# Patient Record
Sex: MALE | Race: WHITE | NOT HISPANIC OR LATINO | Employment: OTHER | URBAN - METROPOLITAN AREA
[De-identification: names, ages, dates, MRNs, and addresses within clinical notes are randomized per-mention and may not be internally consistent; named-entity substitution may affect disease eponyms.]

---

## 2019-08-07 ENCOUNTER — APPOINTMENT (OUTPATIENT)
Dept: RADIOLOGY | Facility: IMAGING CENTER | Age: 59
End: 2019-08-07
Attending: PHYSICIAN ASSISTANT
Payer: COMMERCIAL

## 2019-08-07 ENCOUNTER — OFFICE VISIT (OUTPATIENT)
Dept: URGENT CARE | Facility: CLINIC | Age: 59
End: 2019-08-07
Payer: COMMERCIAL

## 2019-08-07 VITALS
SYSTOLIC BLOOD PRESSURE: 142 MMHG | OXYGEN SATURATION: 98 % | TEMPERATURE: 98.1 F | WEIGHT: 242.6 LBS | BODY MASS INDEX: 31.13 KG/M2 | HEIGHT: 74 IN | HEART RATE: 68 BPM | DIASTOLIC BLOOD PRESSURE: 90 MMHG

## 2019-08-07 DIAGNOSIS — S32.010A CLOSED COMPRESSION FRACTURE OF FIRST LUMBAR VERTEBRA, INITIAL ENCOUNTER: ICD-10-CM

## 2019-08-07 DIAGNOSIS — M54.41 ACUTE BILATERAL LOW BACK PAIN WITH RIGHT-SIDED SCIATICA: ICD-10-CM

## 2019-08-07 PROCEDURE — 72100 X-RAY EXAM L-S SPINE 2/3 VWS: CPT | Mod: TC,FY | Performed by: PHYSICIAN ASSISTANT

## 2019-08-07 PROCEDURE — 99203 OFFICE O/P NEW LOW 30 MIN: CPT | Performed by: PHYSICIAN ASSISTANT

## 2019-08-07 RX ORDER — HYDROCODONE BITARTRATE AND ACETAMINOPHEN 5; 325 MG/1; MG/1
1-2 TABLET ORAL EVERY 4 HOURS PRN
Qty: 20 TAB | Refills: 0 | Status: SHIPPED | OUTPATIENT
Start: 2019-08-07 | End: 2019-08-14

## 2019-08-07 ASSESSMENT — PAIN SCALES - GENERAL: PAINLEVEL: 10=SEVERE PAIN

## 2019-08-07 ASSESSMENT — ENCOUNTER SYMPTOMS
BLURRED VISION: 0
BOWEL INCONTINENCE: 0
WEAKNESS: 0
PALPITATIONS: 0
SENSORY CHANGE: 1
PARESTHESIAS: 1
SHORTNESS OF BREATH: 0
PERIANAL NUMBNESS: 0
HEADACHES: 0
FEVER: 0
BACK PAIN: 1
TINGLING: 1
NAUSEA: 0
CHILLS: 0
VOMITING: 0
LEG PAIN: 1
ABDOMINAL PAIN: 0
WEIGHT LOSS: 0

## 2019-08-07 NOTE — PROGRESS NOTES
Subjective:      Eugene Will is a 59 y.o. male who presents with Back Pain (x 1day. slipped on stairs, landed on back, pain in right leg )       Back Pain    This is a new problem. The current episode started yesterday. The problem occurs constantly. The problem is unchanged. The pain is present in the lumbar spine. The quality of the pain is described as aching, shooting and stabbing. The pain radiates to the right thigh and right knee. The pain is severe. The pain is the same all the time. The symptoms are aggravated by bending, sitting, twisting and lying down. Associated symptoms include leg pain, paresthesias and tingling. Pertinent negatives include no abdominal pain, bladder incontinence, bowel incontinence, chest pain, dysuria, fever, headaches, pelvic pain, perianal numbness, weakness or weight loss. Risk factors include recent trauma (Patient fell yesterday and landed on the stairs to his camper). He has tried NSAIDs for the symptoms. The treatment provided no relief.       Review of Systems   Constitutional: Negative for chills, fever and weight loss.   Eyes: Negative for blurred vision.   Respiratory: Negative for shortness of breath.    Cardiovascular: Negative for chest pain and palpitations.   Gastrointestinal: Negative for abdominal pain, bowel incontinence, nausea and vomiting.   Genitourinary: Negative for bladder incontinence, dysuria and pelvic pain.   Musculoskeletal: Positive for back pain.   Neurological: Positive for tingling, sensory change and paresthesias. Negative for weakness and headaches.       PMH:  has no past medical history on file.  MEDS:   Current Outpatient Medications:   •  HYDROcodone-acetaminophen (NORCO) 5-325 MG Tab per tablet, Take 1-2 Tabs by mouth every four hours as needed for up to 7 days., Disp: 20 Tab, Rfl: 0  ALLERGIES: No Known Allergies  SURGHX: History reviewed. No pertinent surgical history.  SOCHX:  reports that he has never smoked. He has quit using  "smokeless tobacco.  His smokeless tobacco use included chew. He reports that he drank alcohol. He reports that he has current or past drug history.  FH: Family history was reviewed, no pertinent findings to report     Objective:     /90 (BP Location: Left arm, Patient Position: Sitting)   Pulse 68   Temp 36.7 °C (98.1 °F) (Temporal)   Ht 1.88 m (6' 2\")   Wt 110 kg (242 lb 9.6 oz)   SpO2 98%   BMI 31.15 kg/m²       Physical Exam   Constitutional: He is oriented to person, place, and time. He appears well-developed and well-nourished.   HENT:   Head: Normocephalic and atraumatic.   Right Ear: External ear normal.   Left Ear: External ear normal.   Eyes: Pupils are equal, round, and reactive to light. Conjunctivae are normal.   Cardiovascular: Normal rate, regular rhythm and normal heart sounds.   No murmur heard.  Pulmonary/Chest: Effort normal and breath sounds normal. He has no wheezes.   Musculoskeletal:        Lumbar back: He exhibits decreased range of motion, tenderness and bony tenderness. He exhibits no swelling, no edema, no deformity and no laceration.        Back:    Positive straight leg raise test on the right side.  Antalgic gait.   Neurological: He is alert and oriented to person, place, and time.   Skin: Skin is warm and dry. Capillary refill takes less than 2 seconds.   Psychiatric: He has a normal mood and affect. His behavior is normal. Judgment normal.         DX LUMBAR SPINE  Impression       1.  Moderate L1 compression fracture is identified. This could be due to compression fracture or insufficiency fracture of undetermined age.    2.  Grade 1 anterolisthesis at L5-S1.    3.  Prominent degenerative disc disease and facet arthropathy.        Assessment/Plan:     1. Closed compression fracture of first lumbar vertebra, initial encounter ([facility])  - HYDROcodone-acetaminophen (NORCO) 5-325 MG Tab per tablet; Take 1-2 Tabs by mouth every four hours as needed for up to 7 days.  Dispense: 20 " Tab; Refill: 0    2. Acute bilateral low back pain with right-sided sciatica  - HYDROcodone-acetaminophen (NORCO) 5-325 MG Tab per tablet; Take 1-2 Tabs by mouth every four hours as needed for up to 7 days.  Dispense: 20 Tab; Refill: 0      *Patient is from out of town and wants to wait till he returns home to follow-up with his own spine doctor.  Advised that if pain should become any worse he will have to go to the nearest emergency department for further evaluation.        Differential Diagnosis, natural history, and supportive care discussed. Return to the Urgent Care or follow up with your PCP if symptoms fail to resolve, or for any new or worsening symptoms. Emergency room precautions discussed. Patient and/or family appears understanding of information.